# Patient Record
Sex: MALE | Race: WHITE
[De-identification: names, ages, dates, MRNs, and addresses within clinical notes are randomized per-mention and may not be internally consistent; named-entity substitution may affect disease eponyms.]

---

## 2020-07-08 ENCOUNTER — HOSPITAL ENCOUNTER (EMERGENCY)
Dept: HOSPITAL 12 - ER | Age: 60
Discharge: HOME | End: 2020-07-08
Payer: COMMERCIAL

## 2020-07-08 VITALS — WEIGHT: 200 LBS | HEIGHT: 70 IN | BODY MASS INDEX: 28.63 KG/M2

## 2020-07-08 VITALS — SYSTOLIC BLOOD PRESSURE: 150 MMHG | DIASTOLIC BLOOD PRESSURE: 90 MMHG

## 2020-07-08 DIAGNOSIS — L03.115: ICD-10-CM

## 2020-07-08 DIAGNOSIS — L03.116: Primary | ICD-10-CM

## 2020-07-08 DIAGNOSIS — F17.210: ICD-10-CM

## 2020-07-08 DIAGNOSIS — L98.499: ICD-10-CM

## 2020-07-08 PROCEDURE — A4663 DIALYSIS BLOOD PRESSURE CUFF: HCPCS

## 2020-07-08 NOTE — NUR
Patient discharged to home in stable condition.  Written and verbal after care 
instructions given. 

Patient verbalizes understanding of instructions. Stressed follow up or return 
to ER for worsening s/s.

Pt ambulated out of the ER with steady gait. All belongings with pt.

## 2022-04-03 ENCOUNTER — HOSPITAL ENCOUNTER (EMERGENCY)
Dept: HOSPITAL 12 - ER | Age: 62
Discharge: LEFT BEFORE DISCHARGE | End: 2022-04-03
Payer: COMMERCIAL

## 2022-04-03 VITALS — WEIGHT: 195 LBS | HEIGHT: 71 IN | BODY MASS INDEX: 27.3 KG/M2

## 2022-04-03 VITALS — DIASTOLIC BLOOD PRESSURE: 84 MMHG | SYSTOLIC BLOOD PRESSURE: 152 MMHG

## 2022-04-03 DIAGNOSIS — F17.210: ICD-10-CM

## 2022-04-03 DIAGNOSIS — E87.1: ICD-10-CM

## 2022-04-03 DIAGNOSIS — R00.0: ICD-10-CM

## 2022-04-03 DIAGNOSIS — R50.9: ICD-10-CM

## 2022-04-03 DIAGNOSIS — D75.839: ICD-10-CM

## 2022-04-03 DIAGNOSIS — R10.32: Primary | ICD-10-CM

## 2022-04-03 DIAGNOSIS — Z88.0: ICD-10-CM

## 2022-04-03 DIAGNOSIS — D72.829: ICD-10-CM

## 2022-04-03 DIAGNOSIS — Z53.29: ICD-10-CM

## 2022-04-03 LAB
ALP SERPL-CCNC: 124 U/L (ref 50–136)
ALT SERPL W/O P-5'-P-CCNC: 18 U/L (ref 16–63)
AST SERPL-CCNC: 11 U/L (ref 15–37)
BILIRUB DIRECT SERPL-MCNC: 0.3 MG/DL (ref 0–0.2)
BILIRUB SERPL-MCNC: 1.1 MG/DL (ref 0.2–1)
BUN SERPL-MCNC: 11 MG/DL (ref 7–18)
CHLORIDE SERPL-SCNC: 91 MMOL/L (ref 98–107)
CO2 SERPL-SCNC: 26 MMOL/L (ref 21–32)
CREAT SERPL-MCNC: 1.1 MG/DL (ref 0.6–1.3)
GLUCOSE SERPL-MCNC: 126 MG/DL (ref 74–106)
HCT VFR BLD AUTO: 43.5 % (ref 36.7–47.1)
LIPASE SERPL-CCNC: 36 U/L (ref 73–393)
MCH RBC QN AUTO: 29.8 UUG (ref 23.8–33.4)
MCV RBC AUTO: 86.8 FL (ref 73–96.2)
PLATELET # BLD AUTO: 361 K/UL (ref 152–348)
POTASSIUM SERPL-SCNC: 3.9 MMOL/L (ref 3.5–5.1)
WS STN SPEC: 9.2 G/DL (ref 6.4–8.2)

## 2022-04-03 PROCEDURE — 85025 COMPLETE CBC W/AUTO DIFF WBC: CPT

## 2022-04-03 PROCEDURE — 83605 ASSAY OF LACTIC ACID: CPT

## 2022-04-03 PROCEDURE — 96374 THER/PROPH/DIAG INJ IV PUSH: CPT

## 2022-04-03 PROCEDURE — 71045 X-RAY EXAM CHEST 1 VIEW: CPT

## 2022-04-03 PROCEDURE — 96361 HYDRATE IV INFUSION ADD-ON: CPT

## 2022-04-03 PROCEDURE — 36415 COLL VENOUS BLD VENIPUNCTURE: CPT

## 2022-04-03 PROCEDURE — 99285 EMERGENCY DEPT VISIT HI MDM: CPT

## 2022-04-03 PROCEDURE — 83690 ASSAY OF LIPASE: CPT

## 2022-04-03 PROCEDURE — A9150 MISC/EXPER NON-PRESCRIPT DRU: HCPCS

## 2022-04-03 PROCEDURE — 93005 ELECTROCARDIOGRAM TRACING: CPT

## 2022-04-03 PROCEDURE — 80048 BASIC METABOLIC PNL TOTAL CA: CPT

## 2022-04-03 PROCEDURE — 80076 HEPATIC FUNCTION PANEL: CPT

## 2022-04-03 PROCEDURE — 74177 CT ABD & PELVIS W/CONTRAST: CPT

## 2022-04-03 PROCEDURE — 87040 BLOOD CULTURE FOR BACTERIA: CPT

## 2022-04-03 PROCEDURE — 99406 BEHAV CHNG SMOKING 3-10 MIN: CPT
